# Patient Record
Sex: MALE | Race: WHITE
[De-identification: names, ages, dates, MRNs, and addresses within clinical notes are randomized per-mention and may not be internally consistent; named-entity substitution may affect disease eponyms.]

---

## 2017-10-27 ENCOUNTER — HOSPITAL ENCOUNTER (OUTPATIENT)
Dept: HOSPITAL 84 - D.LAB | Age: 37
Discharge: HOME | End: 2017-10-27
Attending: INTERNAL MEDICINE
Payer: MEDICAID

## 2017-10-27 DIAGNOSIS — R10.9: ICD-10-CM

## 2017-10-27 DIAGNOSIS — R63.4: Primary | ICD-10-CM

## 2017-10-27 LAB
ALBUMIN SERPL-MCNC: 3.9 G/DL (ref 3.4–5)
ALP SERPL-CCNC: 73 U/L (ref 46–116)
ALT SERPL-CCNC: 21 U/L (ref 10–68)
AMYLASE SERPL-CCNC: 55 U/L (ref 25–115)
ANION GAP SERPL CALC-SCNC: 11.1 MMOL/L (ref 8–16)
BILIRUB SERPL-MCNC: 0.37 MG/DL (ref 0.2–1.3)
BUN SERPL-MCNC: 11 MG/DL (ref 7–18)
CALCIUM SERPL-MCNC: 9.6 MG/DL (ref 8.5–10.1)
CHLORIDE SERPL-SCNC: 104 MMOL/L (ref 98–107)
CO2 SERPL-SCNC: 29.1 MMOL/L (ref 21–32)
CREAT SERPL-MCNC: 1.1 MG/DL (ref 0.6–1.3)
EOSINOPHIL NFR BLD: 3 % (ref 0–7)
ERYTHROCYTE [DISTWIDTH] IN BLOOD BY AUTOMATED COUNT: 13.1 % (ref 11.5–14.5)
GLOBULIN SER-MCNC: 3.3 G/L
GLUCOSE SERPL-MCNC: 94 MG/DL (ref 74–106)
HCT VFR BLD CALC: 46.2 % (ref 42–54)
HGB BLD-MCNC: 15.9 G/DL (ref 13.5–17.5)
LIPASE SERPL-CCNC: 125 U/L (ref 73–393)
LYMPHOCYTES NFR BLD AUTO: 18 % (ref 15–50)
MCH RBC QN AUTO: 31.7 PG (ref 26–34)
MCHC RBC AUTO-ENTMCNC: 34.4 G/DL (ref 31–37)
MCV RBC: 92 FL (ref 80–100)
MONOCYTES NFR BLD: 2 % (ref 2–11)
NEUTROPHILS NFR BLD AUTO: 77 % (ref 40–80)
OSMOLALITY SERPL CALC.SUM OF ELEC: 277 MOSM/KG (ref 275–300)
PLATELET # BLD EST: NORMAL 10*3/UL
PLATELET # BLD: 256 10X3/UL (ref 130–400)
PMV BLD AUTO: 9.5 FL (ref 7.4–10.4)
POTASSIUM SERPL-SCNC: 4.2 MMOL/L (ref 3.5–5.1)
PROT SERPL-MCNC: 7.2 G/DL (ref 6.4–8.2)
RBC # BLD AUTO: 5.02 10X6/UL (ref 4.2–6.1)
SODIUM SERPL-SCNC: 140 MMOL/L (ref 136–145)
T4 SERPL-MCNC: 8.5 UG/DL (ref 4.7–13.3)
TSH SERPL-ACNC: 1.32 UIU/ML (ref 0.36–3.74)
WBC # BLD AUTO: 20.6 10X3/UL (ref 4.8–10.8)

## 2017-12-26 ENCOUNTER — HOSPITAL ENCOUNTER (EMERGENCY)
Dept: HOSPITAL 84 - D.ER | Age: 37
Discharge: HOME | End: 2017-12-26
Payer: MEDICAID

## 2017-12-26 DIAGNOSIS — H73.891: ICD-10-CM

## 2017-12-26 DIAGNOSIS — H72.91: Primary | ICD-10-CM

## 2018-03-06 ENCOUNTER — HOSPITAL ENCOUNTER (OUTPATIENT)
Dept: HOSPITAL 84 - D.RT | Age: 38
Discharge: HOME | End: 2018-03-06
Attending: NURSE PRACTITIONER
Payer: MEDICAID

## 2018-03-06 DIAGNOSIS — R06.02: Primary | ICD-10-CM

## 2020-02-24 ENCOUNTER — HOSPITAL ENCOUNTER (OUTPATIENT)
Dept: HOSPITAL 84 - D.OPS | Age: 40
Discharge: HOME | End: 2020-02-24
Attending: SURGERY
Payer: MEDICAID

## 2020-02-24 VITALS — HEIGHT: 72 IN | WEIGHT: 174 LBS | BODY MASS INDEX: 23.57 KG/M2 | BODY MASS INDEX: 23.57 KG/M2

## 2020-02-24 VITALS — DIASTOLIC BLOOD PRESSURE: 65 MMHG | SYSTOLIC BLOOD PRESSURE: 109 MMHG

## 2020-02-24 DIAGNOSIS — K64.5: Primary | ICD-10-CM

## 2020-02-24 NOTE — NUR
0910-RECD TO ROOM, ALERT. IV PATENT.  STATES SORE DOWN THERE. DENIES
NAUSEA.
0920-UP TO BATHROOM, UNABLE TO VOID
0925-FULL LIQUIDS SERVED. NO NAUSEA.